# Patient Record
Sex: FEMALE | Race: WHITE | Employment: UNEMPLOYED | URBAN - METROPOLITAN AREA
[De-identification: names, ages, dates, MRNs, and addresses within clinical notes are randomized per-mention and may not be internally consistent; named-entity substitution may affect disease eponyms.]

---

## 2017-12-12 ENCOUNTER — TELEPHONE (OUTPATIENT)
Dept: ORTHOPEDIC SURGERY | Age: 42
End: 2017-12-12

## 2017-12-26 ENCOUNTER — HOSPITAL ENCOUNTER (OUTPATIENT)
Dept: PHYSICAL THERAPY | Age: 42
Discharge: HOME | End: 2017-12-31
Attending: ORTHOPAEDIC SURGERY | Admitting: ORTHOPAEDIC SURGERY

## 2017-12-26 ENCOUNTER — HOSPITAL ENCOUNTER (OUTPATIENT)
Dept: SURGERY | Age: 42
Discharge: HOME | End: 2017-12-26
Attending: ORTHOPAEDIC SURGERY | Admitting: ORTHOPAEDIC SURGERY

## 2017-12-26 VITALS
WEIGHT: 125 LBS | DIASTOLIC BLOOD PRESSURE: 90 MMHG | OXYGEN SATURATION: 98 % | HEART RATE: 88 BPM | HEIGHT: 62 IN | TEMPERATURE: 97.6 F | SYSTOLIC BLOOD PRESSURE: 141 MMHG | BODY MASS INDEX: 23 KG/M2 | RESPIRATION RATE: 15 BRPM

## 2017-12-26 LAB — HCG(URINE) PREGNANCY TEST: NEGATIVE

## 2017-12-26 RX ORDER — HYDROMORPHONE HCL 110MG/55ML
0.5 PATIENT CONTROLLED ANALGESIA SYRINGE INTRAVENOUS EVERY 5 MIN PRN
Status: DISCONTINUED | OUTPATIENT
Start: 2017-12-26 | End: 2017-12-27 | Stop reason: HOSPADM

## 2017-12-26 RX ORDER — SODIUM CHLORIDE, SODIUM LACTATE, POTASSIUM CHLORIDE, CALCIUM CHLORIDE 600; 310; 30; 20 MG/100ML; MG/100ML; MG/100ML; MG/100ML
INJECTION, SOLUTION INTRAVENOUS CONTINUOUS
Status: DISCONTINUED | OUTPATIENT
Start: 2017-12-26 | End: 2017-12-27 | Stop reason: HOSPADM

## 2017-12-26 RX ORDER — HYDROCODONE BITARTRATE AND ACETAMINOPHEN 5; 325 MG/1; MG/1
1 TABLET ORAL
Status: COMPLETED | OUTPATIENT
Start: 2017-12-26 | End: 2017-12-26

## 2017-12-26 RX ORDER — FENTANYL CITRATE 50 UG/ML
25 INJECTION, SOLUTION INTRAMUSCULAR; INTRAVENOUS EVERY 5 MIN PRN
Status: DISCONTINUED | OUTPATIENT
Start: 2017-12-26 | End: 2017-12-27 | Stop reason: HOSPADM

## 2017-12-26 RX ORDER — OXYCODONE HYDROCHLORIDE AND ACETAMINOPHEN 5; 325 MG/1; MG/1
1 TABLET ORAL PRN
Status: ACTIVE | OUTPATIENT
Start: 2017-12-26 | End: 2017-12-26

## 2017-12-26 RX ORDER — DIPHENHYDRAMINE HYDROCHLORIDE 50 MG/ML
12.5 INJECTION INTRAMUSCULAR; INTRAVENOUS
Status: ACTIVE | OUTPATIENT
Start: 2017-12-26 | End: 2017-12-26

## 2017-12-26 RX ORDER — ONDANSETRON 2 MG/ML
4 INJECTION INTRAMUSCULAR; INTRAVENOUS
Status: ACTIVE | OUTPATIENT
Start: 2017-12-26 | End: 2017-12-26

## 2017-12-26 RX ORDER — OXYCODONE HYDROCHLORIDE AND ACETAMINOPHEN 5; 325 MG/1; MG/1
2 TABLET ORAL PRN
Status: ACTIVE | OUTPATIENT
Start: 2017-12-26 | End: 2017-12-26

## 2017-12-26 RX ORDER — LABETALOL HYDROCHLORIDE 5 MG/ML
5 INJECTION, SOLUTION INTRAVENOUS EVERY 10 MIN PRN
Status: DISCONTINUED | OUTPATIENT
Start: 2017-12-26 | End: 2017-12-27 | Stop reason: HOSPADM

## 2017-12-26 RX ORDER — MEPERIDINE HYDROCHLORIDE 25 MG/ML
12.5 INJECTION INTRAMUSCULAR; INTRAVENOUS; SUBCUTANEOUS EVERY 5 MIN PRN
Status: DISCONTINUED | OUTPATIENT
Start: 2017-12-26 | End: 2017-12-27 | Stop reason: HOSPADM

## 2017-12-26 RX ADMIN — Medication 0.5 MG: at 11:05

## 2017-12-26 RX ADMIN — SODIUM CHLORIDE, SODIUM LACTATE, POTASSIUM CHLORIDE, CALCIUM CHLORIDE: 600; 310; 30; 20 INJECTION, SOLUTION INTRAVENOUS at 09:12

## 2017-12-26 RX ADMIN — HYDROCODONE BITARTRATE AND ACETAMINOPHEN 1 TABLET: 5; 325 TABLET ORAL at 11:33

## 2017-12-26 RX ADMIN — Medication 0.5 MG: at 10:56

## 2017-12-26 ASSESSMENT — PAIN DESCRIPTION - PAIN TYPE: TYPE: SURGICAL PAIN

## 2017-12-26 ASSESSMENT — PAIN SCALES - GENERAL
PAINLEVEL_OUTOF10: 6
PAINLEVEL_OUTOF10: 8
PAINLEVEL_OUTOF10: 7
PAINLEVEL_OUTOF10: 5

## 2017-12-26 ASSESSMENT — PAIN DESCRIPTION - LOCATION: LOCATION: KNEE

## 2017-12-26 ASSESSMENT — PAIN DESCRIPTION - ORIENTATION: ORIENTATION: LEFT

## 2017-12-26 ASSESSMENT — PAIN - FUNCTIONAL ASSESSMENT: PAIN_FUNCTIONAL_ASSESSMENT: 0-10

## 2017-12-26 NOTE — PROGRESS NOTES
Adm to pacu from or per cart awakening. Respirations symmetrical. Lt leg elevated on pillow with ace wrap dry & intact. Lt pedal pulse palpable. Lt toes warm & pink with brisk cap refill. Good movement & sensation lt toes. Denies pain . Ice applied.

## 2017-12-26 NOTE — PROGRESS NOTES
H&P Update     A hard copy history and physical was reviewed and is to be scanned into the chart. Date of Surgery Update:  [facility] was seen and examined. There have been no significant clinical changes since the completion of the above History and Physical.  Patient identified by surgeon; surgical site was confirmed by patient and surgeon. ?  Signed By: Caitlin Hernandez    ? December 26, 2017 10:06 AM    ?  ?

## 2017-12-26 NOTE — OP NOTE
[address]                      [address] [facility], [address] [facility]                                 OPERATIVE REPORT    PATIENT NAME: Carin Hickey                       :        1975  MED REC NO:   [id number]                          ROOM:  ACCOUNT NO:   [de-identified]                          ADMIT DATE: 2017  PROVIDER:     Kenny Neal MD    DATE OF PROCEDURE:  2017        PREOPERATIVE DIAGNOSIS:  Painful retained hardware, left tibia. POSTOPERATIVE DIAGNOSIS:  Painful retained hardware, left tibia. PROCEDURE PERFORMED:  Screw removal, left tibia. SURGEON:  Kenny Neal MD.    ASSISTANT:  Marlon Gutierrez MD.    INDICATIONS:  This is a 40-year-old female who previously had tibial  osteotomy for patella realignment. She was quite thin and heads of 2  screws bothered her. So after discussing of the risks, benefits, and  alternatives, this patient wished to proceed with surgery. OPERATIVE NOTE:  The patient was transported to the operating room. After  general anesthesia was induced, a padded tourniquet was placed on the  thigh. The leg was prepped and draped in a sterile fashion. After  exsanguination with the Esmarch bandage, the tourniquet was inflated to 275  mmHg. Old incision was opened and carried down through skin and  subcutaneous tissue. The screws were uncovered and removed. The holes  were curetted. There was no _____ bone around the screw heads, so after  irrigation, subcutaneous tissue was closed with 2-0 Vicryl and skin with  4-0 Monocryl and Steri-Strips. The area was infiltrated with 0.5% Marcaine  and a sterile compressive wrap was applied. The patient tolerated this  procedure well. There were no known complications. She was returned to  recovery area in stable condition.         Sherry Freeman MD    D: 2017 10:51:33       T: 2017 11:05:16     [id number]  Job#: [id number]     Doc#:

## 2017-12-26 NOTE — ANESTHESIA POST-OP
Anesthesia Post-op Note    Patient: Timmy Lopez  MRN: [id number]  YOB: 1975  Date of evaluation: 12/26/2017  Time:  11:52 AM     Procedure(s) Performed:     Last Vitals: BP (!) 141/90   Pulse 88   Temp 97.6 °F (36.4 °C) (Temporal)   Resp 15   Ht 5' 2\" (1.575 m)   Wt 125 lb (56.7 kg)   LMP 12/12/2017   SpO2 98%   BMI 22.86 kg/m²     Kelly Phase I: Kelly Score: 10    Kelly Phase II: Kelly Score: 10    Anesthesia Post Evaluation    Final anesthesia type: general  Patient location during evaluation: PACU  Level of consciousness: awake and alert  Respiratory status: acceptable  Hydration status: euvolemic        CHEMA AGUIRRE MD  11:52 AM

## 2017-12-26 NOTE — PROGRESS NOTES
Awake alert & oriented. States pain has eased to tolerable level. Ace wrap to left knee dry & intact. Lt toes warm & pink with brisk cap refill. Good movement & sensation in left toes. Patient discharged per wheelchair to the care of responsible party. No additional questions voiced related to discharge information. Patient discharged with all personal items.

## 2017-12-26 NOTE — BRIEF OP NOTE
Brief Postoperative Note    [facility]ON  YOB: 1975  [id number]    Pre-operative Diagnosis: painful hardware left tibial tubercle    Post-operative Diagnosis: Same    Procedure: removal of hardware left tibial tubercle    Anesthesia: General and Local    Surgeons/Assistants: Mo Bojorquez MD; Jeffry Rios MD    Estimated Blood Loss: less than 50     Complications: None    Specimens: Was Not Obtained    Findings: prominent screws x 2    Electronically signed by Fredo Che MD on 12/26/2017 at 10:41 AM

## 2017-12-26 NOTE — ANESTHESIA PRE-OP
Department of Anesthesiology  Preprocedure Note       Name:  Leandra Paniagua   Age:  43 y.o.  :  1975                                          MRN:  [id number]         Date:  2017      Surgeon:    Procedure:    Medications prior to admission:   Prior to Admission medications    Medication Sig Start Date End Date Taking? Authorizing Provider   L-methylfolate Calcium 7.5 MG TABS TK 1 T PO  QD 17  Yes Historical Provider, MD   venlafaxine (EFFEXOR XR) 75 MG extended release capsule Take 150 mg by mouth daily  3/10/17  Yes Historical Provider, MD   ALPRAZolam (XANAX) 0.5 MG tablet Take 0.5 mg by mouth 3 times daily as needed  . 9/28/15  Yes Historical Provider, MD   butalbital-acetaminophen-caffeine (FIORICET, ESGIC) -40 MG per tablet Take 1 tablet by mouth every 6 hours as needed  7/7/15  Yes Historical Provider, MD   amphetamine-dextroamphetamine (ADDERALL XR) 30 MG XR capsule Take 30 mg by mouth every morning  . Yes Historical Provider, MD   HYDROcodone-acetaminophen (NORCO) 5-325 MG per tablet Take 1-2 tablets by mouth every 4 hours as needed for Pain . 17   Blake Alan MD       Current medications:    Current Outpatient Prescriptions   Medication Sig Dispense Refill    L-methylfolate Calcium 7.5 MG TABS TK 1 T PO  QD  10    venlafaxine (EFFEXOR XR) 75 MG extended release capsule Take 150 mg by mouth daily       ALPRAZolam (XANAX) 0.5 MG tablet Take 0.5 mg by mouth 3 times daily as needed  .  butalbital-acetaminophen-caffeine (FIORICET, ESGIC) -40 MG per tablet Take 1 tablet by mouth every 6 hours as needed   3    amphetamine-dextroamphetamine (ADDERALL XR) 30 MG XR capsule Take 30 mg by mouth every morning  .  HYDROcodone-acetaminophen (NORCO) 5-325 MG per tablet Take 1-2 tablets by mouth every 4 hours as needed for Pain .  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 No pertinent past medical history. Past Surgical History:        Procedure Laterality Date    CHOLECYSTECTOMY      FRACTURE SURGERY         Social History:    Social History   Substance Use Topics    Smoking status: Former Smoker     Quit date: 12/26/1996    Smokeless tobacco: Never Used    Alcohol use No                                Counseling given: Not Answered      Vital Signs (Current):   Vitals:    12/26/17 0840   BP: 135/82   Pulse: 71   Resp: 16   Temp: 97.1 °F (36.2 °C)   SpO2: 100%   Weight: 125 lb (56.7 kg)   Height: 5' 2\" (1.575 m)                                              BP Readings from Last 3 Encounters:   12/26/17 135/82   12/22/17 127/85   11/06/17 131/88       NPO Status: Time of last liquid consumption: 0000                        Time of last solid consumption: 0000                        Date of last liquid consumption: 12/26/17                        Date of last solid food consumption: 12/26/17    BMI:   Wt Readings from Last 3 Encounters:   12/26/17 125 lb (56.7 kg)   12/22/17 119 lb 14.9 oz (54.4 kg)   11/06/17 120 lb (54.4 kg)     Body mass index is 22.86 kg/m².     Anesthesia Evaluation  Patient summary reviewed and Nursing notes reviewed  Airway: Mallampati: I  TM distance: >3 FB   Neck ROM: full  Mouth opening: > = 3 FB Dental: normal exam         Pulmonary:Negative Pulmonary ROS                              Cardiovascular:Negative CV ROS  Exercise tolerance: good (>4 METS),           Rhythm: regular

## 2017-12-27 ENCOUNTER — HOSPITAL ENCOUNTER (OUTPATIENT)
Dept: PHYSICAL THERAPY | Age: 42
Discharge: HOME | End: 2017-12-27
Admitting: ORTHOPAEDIC SURGERY

## 2017-12-27 ENCOUNTER — OFFICE VISIT (OUTPATIENT)
Dept: ORTHOPEDIC SURGERY | Age: 42
End: 2017-12-27

## 2017-12-27 VITALS
HEIGHT: 62 IN | SYSTOLIC BLOOD PRESSURE: 120 MMHG | BODY MASS INDEX: 23 KG/M2 | DIASTOLIC BLOOD PRESSURE: 72 MMHG | WEIGHT: 125 LBS | HEART RATE: 82 BPM

## 2017-12-27 DIAGNOSIS — M25.562 LEFT KNEE PAIN, UNSPECIFIED CHRONICITY: Primary | ICD-10-CM

## 2017-12-27 PROCEDURE — 99024 POSTOP FOLLOW-UP VISIT: CPT | Performed by: ORTHOPAEDIC SURGERY

## 2017-12-27 NOTE — FLOWSHEET NOTE
interventions                 Patient Education:   12/27/17: Patient educated about PT diagnosis, prognosis, and plan of care; educated on role of physical therapy; educated on HEP; full PO education including emphasis on ROM and R.I.C.E.    HEP: Patient instructed on HEP on this date with handout provided and all questions answered. Patient was instructed to contact PT with any complications or questions about HEP moving forward. Patient verbally stated she understood. Updated 12/27/17    Therapeutic Exercise and NMR EXR  [x] (24459) Provided verbal/tactile cueing for activities related to strengthening, flexibility, endurance, ROM for improvements in LE, proximal hip, and core control with self care, mobility, lifting, ambulation.  [] (75383) Provided verbal/tactile cueing for activities related to improving balance, coordination, kinesthetic sense, posture, motor skill, proprioception  to assist with LE, proximal hip, and core control in self care, mobility, lifting, ambulation and eccentric single leg control.      NMR and Therapeutic Activities:    [x] (00613 or 31531) Provided verbal/tactile cueing for activities related to improving balance, coordination, kinesthetic sense, posture, motor skill, proprioception and motor activation to allow for proper function of core, proximal hip and LE with self care and ADLs  [] (66469) Gait Re-education- Provided training and instruction to the patient for proper LE, core and proximal hip recruitment and positioning and eccentric body weight control with ambulation re-education including up and down stairs     Home Exercise Program:    [x] (04375) Reviewed/Progressed HEP activities related to strengthening, flexibility, endurance, ROM of core, proximal hip and LE for functional self-care, mobility, lifting and ambulation/stair navigation   [] (33357)Reviewed/Progressed HEP activities related to improving balance, coordination, kinesthetic sense, posture, motor skill, proprioception of core, proximal hip and LE for self care, mobility, lifting, and ambulation/stair navigation      Manual Treatments:  PROM / STM / Oscillations-Mobs:  G-I, II, III, IV (PA's, Inf., Post.)  [] (59864) Provided manual therapy to mobilize LE, proximal hip and/or LS spine soft tissue/joints for the purpose of modulating pain, promoting relaxation,  increasing ROM, reducing/eliminating soft tissue swelling/inflammation/restriction, improving soft tissue extensibility and allowing for proper ROM for normal function with self care, mobility, lifting and ambulation. Modalities:  Vaso 15'    Charges:  Timed Code Treatment Minutes: 30   Total Treatment Minutes: 60       [] EVAL (LOW) 79549 (typically 20 minutes face-to-face)  [x] EVAL (MOD) 88068 (typically 30 minutes face-to-face)  [] EVAL (HIGH) 10305 (typically 45 minutes face-to-face)  [] RE-EVAL   [x] BB(81145) x  1   [] IONTO  [] NMR (59835) x      [x] VASO  [] Manual (86783) x       [] Other:  [x] TA x       [] Mech Traction (04809)  [] ES(attended) (27874)      [] ES (un) (86476):     GOALS:  Patient stated goal:  Return to normal activity levels without pain     Therapist goals for Patient:   Short Term Goals: To be achieved in: 2 weeks  1. Independent in HEP and progression per patient tolerance, in order to prevent re-injury. 2. Patient will have a decrease in pain to facilitate improvement in movement, function, and ADLs as indicated by Functional Deficits.     Long Term Goals: To be achieved in: 12 weeks  1. Disability index score of 30% or less for the LEFS to assist with reaching prior level of function. 2. Patient will demonstrate increased L Knee AROM to 0-135 or better to allow for proper joint functioning as indicated by patients Functional Deficits. 3. Patient will demonstrate an increase in Strength in BLE to 4/5 or greater to allow for proper functional mobility as indicated by patients Functional Deficits.    4. Patient will return to ambulating > 30 minutes without increased symptoms or restriction to return to community ambulation. 5. Patient will return to work full time without pain or swelling during or after her shift (patient specific functional goal)             Progression Towards Functional goals:  [] Patient is progressing as expected towards functional goals listed. [] Progression is slowed due to complexities listed. [] Progression has been slowed due to co-morbidities.   [x] Plan just implemented, too soon to assess goals progression  [] Other:     ASSESSMENT:  See eval    Treatment/Activity Tolerance:  [x] Patient tolerated treatment well [] Patient limited by fatique  [] Patient limited by pain  [] Patient limited by other medical complications  [] Other:     Prognosis: [x] Good [] Fair  [] Poor    Patient Requires Follow-up: [x] Yes  [] No    PLAN: See eval  [] Continue per plan of care [] Alter current plan (see comments)  [x] Plan of care initiated [] Hold pending MD visit [] Discharge    Electronically signed by: Denzel Lua PT, DPT

## 2017-12-27 NOTE — PROGRESS NOTES
Patient returns today one day status post left tibial screw removal.  She doing very well. ROS: Pertinent items are noted in HPI. No notes on file    History reviewed. No pertinent past medical history. Past Surgical History:  No date: CHOLECYSTECTOMY  No date: FRACTURE SURGERY  12/26/2017: KNEE SURGERY Left      Comment: OPEN LEFT KNEE SCREW REMOVAL (TWO)    History reviewed. No pertinent family history. Social History    Marital status:              Spouse name:                       Years of education:                 Number of children:               Social History Main Topics    Smoking status: Former Smoker                                                                Packs/day: 0.00      Years: 0.00           Quit date: 12/26/1996    Smokeless tobacco: Never Used                        Alcohol use: No              Drug use: No                Current Outpatient Prescriptions:  L-methylfolate Calcium 7.5 MG TABS, TK 1 T PO  QD, Disp: , Rfl: 10  HYDROcodone-acetaminophen (NORCO) 5-325 MG per tablet, Take 1-2 tablets by mouth every 4 hours as needed for Pain ., Disp: 40 tablet, Rfl: 0  venlafaxine (EFFEXOR XR) 75 MG extended release capsule, Take 150 mg by mouth daily , Disp: , Rfl:   ALPRAZolam (XANAX) 0.5 MG tablet, Take 0.5 mg by mouth 3 times daily as needed  . , Disp: , Rfl:   butalbital-acetaminophen-caffeine (FIORICET, ESGIC) -40 MG per tablet, Take 1 tablet by mouth every 6 hours as needed , Disp: , Rfl: 3  amphetamine-dextroamphetamine (ADDERALL XR) 30 MG XR capsule, Take 30 mg by mouth every morning  ., Disp: , Rfl:     No current facility-administered medications for this visit. -- Codeine     --  Other reaction(s): Unknown    VITAL SIGNS:  /72   Pulse 82   Ht 5' 2.01\" (1.575 m)   Wt 125 lb (56.7 kg)   LMP 12/12/2017   BMI 22.86 kg/m²   Examination the left leg shows that the wound is clean and dry. Steri-Strips are intact. She has no swelling.   She has

## 2017-12-27 NOTE — PLAN OF CARE
over incision but much improved from prior to surgery. Notes she had a Proximal-Distal realignment 2 years ago and ever since then, hardware has been pushing up onto her skin causing pain. Relevant Medical History L Knee Hardware removal 12/26/17  Functional Disability Index:PT G-Codes  Functional Assessment Tool Used: LEFS  Score: 74% deficit  Functional Limitation: Mobility: Walking and moving around  Mobility: Walking and Moving Around Current Status (): At least 60 percent but less than 80 percent impaired, limited or restricted  Mobility: Walking and Moving Around Goal Status ():  At least 1 percent but less than 20 percent impaired, limited or restricted    Pain Scale: 4/10 rest, 6/10 worst; discomfort over incision on knee  Easing factors: Ice, Norco  Provocative factors: Surgery     Type: [x]Constant   []Intermittent  []Radiating [x]Localized []other:     Numbness/Tingling: Denies    Occupation/School: Works  for [employer] with many hours daily spent on her feet    Living Status/Prior Level of Function: Independent with ADLs and IADLs; has had pain with normal ADL's and community ambulation over past two years, wishes to be pain-free    OBJECTIVE:      12/27/17  ROM PROM AROM Overpressure Comment    L R L R L R    Flexion   121 138      Extension   +4 +3                            12/27/17  Strength L R Comment   Quad 3+/5 5/5 Quad Set   Hamstring NT - PO NT - PO    Gastroc NT - PO NT - PO    Hip  flexion NT - PO NT - PO    Hip abd NT - PO NT - PO                  12/27/17  Special Test Results/Comment   Homans Negative   Temperature 98.6     12/27/17  Girth L R   Mid Patella 35.8 34.4   Suprapatellar 35.6 35.4   5cm above 38.5 38.8   15cm above       Reflexes/Sensation:    [x]Dermatomes/Myotomes intact    [x]Reflexes equal and normal bilaterally   []Other:    Joint mobility:     [x]Normal    []Hypo   []Hyper    Palpation: Generalized tenderness over incision from surgery; denies []Co-Morbidities              []Cognitive Function, education/learning barriers              []Environmental, home barriers              []profession/work barriers  [x]past PT/medical experience  []other:  Justification: Has had PT here in the past for previous surgery so has good idea of expectations of her following surgery    Falls Risk Assessment (30 days):   [x] Falls Risk assessed and no intervention required. [] Falls Risk assessed and Patient requires intervention due to being higher risk   TUG score (>12s at risk):     [] Falls education provided, including       G-Codes:  PT G-Codes  Functional Assessment Tool Used: LEFS  Score: 74% deficit  Functional Limitation: Mobility: Walking and moving around  Mobility: Walking and Moving Around Current Status (): At least 60 percent but less than 80 percent impaired, limited or restricted  Mobility: Walking and Moving Around Goal Status ():  At least 1 percent but less than 20 percent impaired, limited or restricted    ASSESSMENT:   Functional Impairments:     [x]Noted lumbar/proximal hip/LE hypomobility   [x]Decreased LE functional ROM   [x]Decreased core/proximal hip strength and neuromuscular control   [x]Decreased LE functional strength   [x]Reduced balance/proprioceptive control   []other:      Functional Activity Limitations (from functional questionnaire and intake)   [x]Reduced ability to tolerate prolonged functional positions   [x]Reduced ability or difficulty with changes of positions or transfers between positions   [x]Reduced ability to maintain good posture and demonstrate good body mechanics with sitting, bending, and lifting   [x]Reduced ability to sleep   [x] Reduced ability or tolerance with driving and/or computer work   [x]Reduced ability to perform lifting, carrying tasks   [x]Reduced ability to squat   []Reduced ability to forward bend   [x]Reduced ability to ambulate prolonged functional periods/distances/surfaces   [x]Reduced ability to ascend/descend stairs   [x]Reduced ability to run, hop or jump   []other:     Participation Restrictions   [x]Reduced participation in self care activities   [x]Reduced participation in home management activities   [x]Reduced participation in work activities   [x]Reduced participation in social activities. [x]Reduced participation in sport activities. Classification :    [x]Signs/symptoms consistent with post-surgical status including decreased ROM, strength and function.    []Signs/symptoms consistent with joint sprain/strain   []Signs/symptoms consistent with patella-femoral syndrome   []Signs/symptoms consistent with knee OA/hip OA   []Signs/symptoms consistent with internal derangement of knee/Hip   []Signs/symptoms consistent with functional hip weakness/NMR control      []Signs/symptoms consistent with tendinitis/tendinosis    []signs/symptoms consistent with pathology which may benefit from Dry needling      []other:      Prognosis/Rehab Potential:      []Excellent   [x]Good    []Fair   []Poor    Tolerance of evaluation/treatment:    []Excellent   [x]Good    []Fair   []Poor    Physical Therapy Evaluation Complexity Justification  [x] A history of present problem with:  [] no personal factors and/or comorbidities that impact the plan of care;  [x]1-2 personal factors and/or comorbidities that impact the plan of care  []3 personal factors and/or comorbidities that impact the plan of care  [x] An examination of body systems using standardized tests and measures addressing any of the following: body structures and functions (impairments), activity limitations, and/or participation restrictions;:  [] a total of 1-2 or more elements   [] a total of 3 or more elements   [x] a total of 4 or more elements   [x] A clinical presentation with:  [] stable and/or uncomplicated characteristics   [x] evolving clinical presentation with changing characteristics  [] unstable and unpredictable characteristics; [x] Clinical decision making of [] low, [x] moderate, [] high complexity using standardized patient assessment instrument and/or measurable assessment of functional outcome. [] EVAL (LOW) 80625 (typically 20 minutes face-to-face)  [x] EVAL (MOD) 12282 (typically 30 minutes face-to-face)  [] EVAL (HIGH) 39097 (typically 45 minutes face-to-face)  [] RE-EVAL     PLAN  Frequency/Duration:  1-2 days per week for 12 Weeks:  Interventions:  [x]  Therapeutic exercise including: strength training, ROM, for Lower extremity and core   [x]  NMR activation and proprioception for LE, Glutes and Core   [x]  Manual therapy as indicated for LE, Hip and spine to include: Dry Needling/IASTM, STM, PROM, Gr I-IV mobilizations, manipulation. [x] Modalities as needed that may include: thermal agents, E-stim, Biofeedback, US, iontophoresis as indicated  [x] Patient education on joint protection, postural re-education, activity modification, progression of HEP. HEP instruction: Patient instructed on HEP on this date with handout provided and all questions answered. Patient was instructed to contact PT with any complications or questions about HEP moving forward. Patient verbally stated she understood. GOALS:  Patient stated goal:  Return to normal activity levels without pain    Therapist goals for Patient:   Short Term Goals: To be achieved in: 2 weeks  1. Independent in HEP and progression per patient tolerance, in order to prevent re-injury. 2. Patient will have a decrease in pain to facilitate improvement in movement, function, and ADLs as indicated by Functional Deficits. Long Term Goals: To be achieved in: 12 weeks  1. Disability index score of 30% or less for the LEFS to assist with reaching prior level of function. 2. Patient will demonstrate increased L Knee AROM to 0-135 or better to allow for proper joint functioning as indicated by patients Functional Deficits.    3. Patient will demonstrate an increase in

## 2018-01-01 ENCOUNTER — HOSPITAL ENCOUNTER (OUTPATIENT)
Dept: PHYSICAL THERAPY | Age: 43
Discharge: HOME | End: 2018-01-31
Attending: ORTHOPAEDIC SURGERY | Admitting: ORTHOPAEDIC SURGERY